# Patient Record
Sex: MALE | Race: OTHER | Employment: UNEMPLOYED | URBAN - METROPOLITAN AREA
[De-identification: names, ages, dates, MRNs, and addresses within clinical notes are randomized per-mention and may not be internally consistent; named-entity substitution may affect disease eponyms.]

---

## 2017-05-29 ENCOUNTER — HOSPITAL ENCOUNTER (EMERGENCY)
Facility: HOSPITAL | Age: 22
Discharge: HOME | End: 2017-05-29
Attending: EMERGENCY MEDICINE
Payer: MEDICAID

## 2017-05-29 VITALS
SYSTOLIC BLOOD PRESSURE: 143 MMHG | OXYGEN SATURATION: 100 % | TEMPERATURE: 99 F | HEART RATE: 68 BPM | RESPIRATION RATE: 16 BRPM | DIASTOLIC BLOOD PRESSURE: 90 MMHG | HEIGHT: 67 IN | WEIGHT: 155 LBS | BODY MASS INDEX: 24.33 KG/M2

## 2017-05-29 DIAGNOSIS — S09.90XA CLOSED HEAD INJURY, INITIAL ENCOUNTER: Primary | ICD-10-CM

## 2017-05-29 DIAGNOSIS — S06.0X0A CONCUSSION, WITHOUT LOC, INITIAL ENCOUNTER: ICD-10-CM

## 2017-05-29 PROCEDURE — 99283 EMERGENCY DEPT VISIT LOW MDM: CPT
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
## (undated) NOTE — ED AVS SNAPSHOT
[facility] Emergency Department    [place]  [address] [address]    Phone:  [phone]    Fax:  [address]   MRN: [id number]    Department:  [facility] Emergency Department   Date of Visit:  5/29/20 IF THERE IS ANY CHANGE OR WORSENING OF YOUR CONDITION, CALL YOUR PRIMARY CARE PHYSICIAN AT ONCE OR RETURN IMMEDIATELY TO THE EMERGENCY DEPARTMENT.     If you have been prescribed any medication(s), please fill your prescription right away and begin taking t

## (undated) NOTE — LETTER
May 29, 2017    Patient: Lisa Cardoso   Date of Visit: 5/29/2017       To Whom It May Concern:    Lisa Cardoso was seen and treated in our emergency department on 5/29/2017. He should not return to work until 6/1/17.     If you have any questions or con

## (undated) NOTE — ED AVS SNAPSHOT
[facility] Emergency Department    [place] [address] [address]    Phone:  [phone]    Fax:  [address]   MRN: [id number]    Department:  [facility] Emergency Department   Date of Visit:  5/29/20 nuestro adminstrador de jv mims [phone]    Expect to receive an electronic request (by e-mail or text) to complete a self-assessment the day after your visit. You may also receive a call from our patient liason soon after your visit.  Also, some p [facility] [address] ([address]) [address]  ([address]) [address] [facility]. ([address]) [address] (8 not sign up before the expiration date, you must request a new code. Your unique [employer] Access Code: [email or web address]  Expires: 7/28/2017 11:51 PM    If you have questions, you can call [phone] to talk to our [facility] Staff.  Remember, [employer]